# Patient Record
Sex: FEMALE | Race: WHITE | NOT HISPANIC OR LATINO | Employment: OTHER | URBAN - METROPOLITAN AREA
[De-identification: names, ages, dates, MRNs, and addresses within clinical notes are randomized per-mention and may not be internally consistent; named-entity substitution may affect disease eponyms.]

---

## 2017-01-25 ENCOUNTER — ALLSCRIPTS OFFICE VISIT (OUTPATIENT)
Dept: OTHER | Facility: OTHER | Age: 67
End: 2017-01-25

## 2018-01-13 VITALS
RESPIRATION RATE: 14 BRPM | DIASTOLIC BLOOD PRESSURE: 54 MMHG | HEIGHT: 63 IN | BODY MASS INDEX: 20.91 KG/M2 | WEIGHT: 118 LBS | SYSTOLIC BLOOD PRESSURE: 90 MMHG | HEART RATE: 84 BPM

## 2018-01-24 ENCOUNTER — OFFICE VISIT (OUTPATIENT)
Dept: NEUROLOGY | Facility: CLINIC | Age: 68
End: 2018-01-24
Payer: MEDICARE

## 2018-01-24 VITALS
SYSTOLIC BLOOD PRESSURE: 92 MMHG | HEIGHT: 63 IN | RESPIRATION RATE: 16 BRPM | BODY MASS INDEX: 20.55 KG/M2 | HEART RATE: 78 BPM | WEIGHT: 116 LBS | DIASTOLIC BLOOD PRESSURE: 50 MMHG

## 2018-01-24 DIAGNOSIS — G30.0 EARLY ONSET ALZHEIMER'S DEMENTIA WITHOUT BEHAVIORAL DISTURBANCE (HCC): Primary | ICD-10-CM

## 2018-01-24 DIAGNOSIS — F02.80 EARLY ONSET ALZHEIMER'S DEMENTIA WITHOUT BEHAVIORAL DISTURBANCE (HCC): Primary | ICD-10-CM

## 2018-01-24 PROCEDURE — 99214 OFFICE O/P EST MOD 30 MIN: CPT | Performed by: PSYCHIATRY & NEUROLOGY

## 2018-01-24 RX ORDER — ACETAMINOPHEN 325 MG/1
650 TABLET ORAL EVERY 12 HOURS PRN
COMMUNITY

## 2018-01-24 RX ORDER — CRANBERRY FRUIT EXTRACT 200 MG
200 CAPSULE ORAL DAILY
COMMUNITY

## 2018-01-24 RX ORDER — FLUDROCORTISONE ACETATE 0.1 MG/1
0.1 TABLET ORAL DAILY
COMMUNITY

## 2018-01-24 RX ORDER — DIVALPROEX SODIUM 125 MG/1
250 TABLET, DELAYED RELEASE ORAL EVERY 12 HOURS SCHEDULED
COMMUNITY

## 2018-01-24 RX ORDER — SERTRALINE HYDROCHLORIDE 25 MG/1
25 TABLET, FILM COATED ORAL DAILY
COMMUNITY

## 2018-01-24 RX ORDER — DONEPEZIL HYDROCHLORIDE 5 MG/1
5 TABLET, FILM COATED ORAL
COMMUNITY

## 2018-01-24 RX ORDER — LANOLIN ALCOHOL/MO/W.PET/CERES
3 CREAM (GRAM) TOPICAL
COMMUNITY

## 2018-01-24 RX ORDER — MEMANTINE HYDROCHLORIDE 10 MG/1
10 TABLET ORAL DAILY
COMMUNITY

## 2018-01-24 NOTE — PATIENT INSTRUCTIONS
Advanced Alzheimer dementia- progression since last seen  Time spent discussing prognosis and medications  At this point, medication are not likely beneficial therefore will discontinue Namenda and donepezil  She will remain on Depakote and sertraline for mood  In 6 months, would suggest primary physician at facility assess whether she is if stable and having no bouts of aggression or mood lability causing difficulty managing care  If so would recommend tapering off Depakote

## 2018-01-24 NOTE — PROGRESS NOTES
Patient ID: Eva Rasmussen is a 78 y o  female  Assessment/Plan:    Early onset Alzheimer's dementia without behavioral disturbance  Progression since last seen  Physically doing well but no longer communicating effectively  Time spent discussing prognosis and role of medication in Ad  At this point I do not believe Namenda and donepezil are providing much benefit and therefore will discontinue  She will remain on Depakote and Sertraline  Primary physician at the facility should reassess in 6 months and if mood stable with no acts of aggression or trouble with care, would recommend tapering off Depakote  Problem List Items Addressed This Visit     Early onset Alzheimer's dementia without behavioral disturbance - Primary     Progression since last seen  Physically doing well but no longer communicating effectively  Time spent discussing prognosis and role of medication in Ad  At this point I do not believe Namenda and donepezil are providing much benefit and therefore will discontinue  She will remain on Depakote and Sertraline  Primary physician at the facility should reassess in 6 months and if mood stable with no acts of aggression or trouble with care, would recommend tapering off Depakote  Relevant Medications    donepezil (ARICEPT) 5 mg tablet    memantine (NAMENDA) 10 mg tablet    sertraline (ZOLOFT) 25 mg tablet             Subjective:    Patient is a [facility] with Alzheimer's disease who presents for follow up ; First seen in 2017 wit advanced AD,  requiring assistance with all activities and unable to hold a conversation, on Namenda 10mg daily and donepezil 5mg daily (previously on higher doses)  She was diagnosed with Alzheimer disease in 2010 by Dr Maria Luisa Moncada in [facility]  Physically functioning well   When first seen after a discussion regarding prognosis with daughter, and answering questions regarding the role of medications and cost  It was suggested se stop Namenda given response is variable at this point  She was also on a study drug when first seen  Patient remains on donepezil 5mg daily, Namenda 10mg, Depakote bid, sertraline 25mg daily for mood  Also on fludricortisone at tis point  She needs assistance with all ADL's  She no longer can communicate effectively  Appetite has decreased but weight is stable  Daughter has noted she tends to flex forward at time sbut is easily redirected  She also has bouts where she makes a noise lasting a minute or so  She does not appear in pain  Her mother and sister visit often and express concern  She is sleeping well for the most part  The family has questions regarding need for medications given cost          The following portions of the patient's history were reviewed and updated as appropriate: allergies, current medications, past medical history, past surgical history and problem list          Objective:    Blood pressure 92/50, pulse 78, resp  rate 16, height 5' 3" (1 6 m), weight 52 6 kg (116 lb)  Physical Exam   Eyes: EOM are normal  Pupils are equal, round, and reactive to light  Neurological: She has normal strength  Gait normal    Psychiatric: Her speech is normal        Neurological Exam    Mental Status  The patient is alert and disoriented to place, time and situation and oriented to person  Her speech is normal  She is able to name object, repeat and write  She has poor concentration  She is unable to follow commands  She has poor comprehension  Cranial Nerves    CN II: The patient's visual acuity and visual fields are normal   CN III, IV, VI: The patient's pupils are equally round and reactive to light and ocular movements are normal   CN V: The patient has normal facial sensation  CN VII:  The patient has symmetric facial movement  CN VIII:  The patient's hearing is normal   CN IX, X: The patient has symmetric palate movement and normal gag reflex    CN XI: The patient's shoulder shrug strength is normal   CN XII: The patient's tongue is midline without atrophy or fasciculations  Motor   Her overall muscle tone is normal throughout  Her strength is 5/5 throughout all four extremities  Sensory  The patient's sensation is to light touch  Reflexes  She has glabellar tap release signs present  Gait and Coordination  The patient has normal gait and station  Needs to be guided, postural stooping         ROS:    Review of Systems   Constitutional: Positive for appetite change  Negative for fever  HENT: Negative  Negative for hearing loss, tinnitus, trouble swallowing and voice change  Eyes: Negative  Negative for photophobia and pain  Respiratory: Negative  Negative for shortness of breath  Cardiovascular: Negative  Negative for palpitations  Gastrointestinal: Negative  Negative for nausea and vomiting  Endocrine: Negative  Negative for cold intolerance and heat intolerance  Genitourinary: Negative  Negative for dysuria, frequency and urgency  Musculoskeletal: Negative for myalgias and neck pain  Skin: Negative  Negative for rash  Neurological: Positive for speech difficulty  Negative for dizziness, tremors, seizures, syncope, facial asymmetry, weakness, light-headedness, numbness and headaches  Hematological: Negative  Does not bruise/bleed easily  Psychiatric/Behavioral: Positive for confusion and sleep disturbance  Negative for hallucinations

## 2018-01-24 NOTE — ASSESSMENT & PLAN NOTE
Progression since last seen  Physically doing well but no longer communicating effectively  Time spent discussing prognosis and role of medication in Ad  At this point I do not believe Namenda and donepezil are providing much benefit and therefore will discontinue  She will remain on Depakote and Sertraline  Primary physician at the facility should reassess in 6 months and if mood stable with no acts of aggression or trouble with care, would recommend tapering off Depakote

## 2020-11-06 LAB — EXT SARS-COV-2: NOT DETECTED

## 2021-01-13 LAB — EXT SARS-COV-2: NOT DETECTED

## 2021-01-18 LAB — EXT SARS-COV-2: NOT DETECTED

## 2021-01-27 LAB — EXT SARS-COV-2: NOT DETECTED

## 2021-07-05 ENCOUNTER — TELEPHONE (OUTPATIENT)
Dept: OTHER | Facility: OTHER | Age: 71
End: 2021-07-05

## 2021-07-05 NOTE — TELEPHONE ENCOUNTER
Kalie Monahan from [facility] called regarding Patient Seems Weaker, Tired, Elevated Heart Rate of 100 Pulse 0x 87, but went up to 91  Having a hard time walking      Paged the on call Provider

## 2021-08-02 ENCOUNTER — TELEPHONE (OUTPATIENT)
Dept: OTHER | Facility: OTHER | Age: 71
End: 2021-08-02

## 2021-08-03 NOTE — TELEPHONE ENCOUNTER
The patient fell and has pain  They are going to start the process of getting her to the ER  The on-call provider was paged

## 2023-02-13 RX ORDER — ACETAMINOPHEN 325 MG/1
650 TABLET ORAL DAILY
COMMUNITY

## 2023-02-13 RX ORDER — MORPHINE SULFATE 10 MG/5ML
5 SOLUTION ORAL
COMMUNITY
End: 2023-02-14 | Stop reason: ALTCHOICE

## 2023-02-13 RX ORDER — POLYETHYLENE GLYCOL 3350 17 G/17G
17 POWDER, FOR SOLUTION ORAL DAILY
COMMUNITY

## 2023-02-13 RX ORDER — CHOLECALCIFEROL (VITAMIN D3) 125 MCG
5 CAPSULE ORAL
COMMUNITY

## 2023-02-13 RX ORDER — LORAZEPAM 2 MG/ML
0.25 CONCENTRATE ORAL EVERY 4 HOURS PRN
COMMUNITY
End: 2023-02-14 | Stop reason: ALTCHOICE

## 2023-02-13 RX ORDER — ACETAMINOPHEN 325 MG/1
650 TABLET ORAL 3 TIMES DAILY PRN
COMMUNITY

## 2023-02-14 ENCOUNTER — IN HOME VISIT (OUTPATIENT)
Dept: GERIATRICS | Facility: OTHER | Age: 73
End: 2023-02-14

## 2023-02-14 DIAGNOSIS — K59.01 SLOW TRANSIT CONSTIPATION: ICD-10-CM

## 2023-02-14 DIAGNOSIS — F02.80 EARLY ONSET ALZHEIMER'S DEMENTIA WITHOUT BEHAVIORAL DISTURBANCE (HCC): Primary | ICD-10-CM

## 2023-02-14 DIAGNOSIS — Z91.81 HISTORY OF FALLING: ICD-10-CM

## 2023-02-14 DIAGNOSIS — G30.0 EARLY ONSET ALZHEIMER'S DEMENTIA WITHOUT BEHAVIORAL DISTURBANCE (HCC): Primary | ICD-10-CM

## 2023-02-14 DIAGNOSIS — E03.9 HYPOTHYROIDISM, UNSPECIFIED TYPE: ICD-10-CM

## 2023-02-14 NOTE — PROGRESS NOTES
[address], [address], [facility], [address] [place] [address]  [phone]    NAME: Benny Arenas  AGE: 71 y o  SEX: female    Progress Note    Location: [facility] ([facility])  POS: 13    Assessment/Plan:    Early onset Alzheimer's dementia without behavioral disturbance ([address] )  Continue [facility] supportive care  Recently taken off hospice care: stable  Per nursing, patient with good to fair meal completion  Per nursing, poor sleep patterns at night time but no sundowning behaviors - makes up sleep at daytime  D/C PRN Lorazepam Q4 hours and Morphine Q1 hour  Continue Tylenol 650mg daily in AM  Continue PRN Tylenol 650mg TID  Continue Melatonin 5mg daily at HS  Has pending labs on March 2, 2023: CBC with diff/ CMP/ TSH    Hypothyroidism  Last TSH (3/8/2022): 3 12 (WNL)  Currently not on T4 replacement medication (prev hospice)  TSH with reflex Free T4 on 3/2/2023    Slow transit constipation  Continue Miralax 17G daily    History of falling  No recent fall reported  [facility]    Chief complaint / Reason for visit: Follow-up visit    History of Present Illness: This is a 18-year-old female patient admitted at [facility] community  Patient is seen and examined today to follow-up acute on chronic medical conditions: Early onset Alzheimer's Disease, Ambulatory dysfunction with history of falls, Insomnia and hypothyroidism  Patient is out of bed for this visit sitting in the common room -sleepy this morning but according to nursing this is baseline for patient as she slept very poorly at bedtime  Patient is also nonverbal nor did she engage in conversation or participated during physical assessment despite multiple attempts to engage in conversation or when given simple instructions  Per nursing, this is baseline for patient -typically only mumbles words that are unintelligible  Unable to assess for ROS  Update and information about patient provided by nursing staff on this visit  Per nursing, other than poor sleep at bedtime, patient is deemed at baseline and stable  Patient is also being followed by podiatry  Patient was previously under hospice management but had since discontinued - patient stable  Nursing and prior provider notes reviewed on this visit  Discussed with PCP and nursing supervisor  Called and spoken to patient POA (son) today  Updated on visit today  Discussed about patient meds as well  Agreeable to D/C comfort meds: Lorazepam (sleepy at daytime) and Morphine  Review of Systems:  Per history of present illness, all other systems reviewed and negative  HISTORY:  Medical Hx: Reviewed, unchanged  Family Hx: Reviewed, unchanged  Soc Hx: Reviewed,  unchanged    ALLERGY: Reviewed, unchanged  Allergies   Allergen Reactions   • Sulfa Antibiotics Other (See Comments)     per t system        PHYSICAL EXAM:  Vital Signs: T98 3F -P92 -R16 BP: 175/60 (2/6/2023) SpO2: 100% RA  Weight: 104 3 lbs (2/1/2023)    General: NAD  Frail stature  Head: Atraumatic  Normocephalic  Eye Exam: anicteric sclera, no discharge, PERRLA, No injection  Oral Exam: unable to assess - did not participate  Neck Exam: no anterior cervical lymphadenopathy noted, neck supple  Cardiovascular: regular rate, regular rhythm, no murmurs, rubs, or gallops  Pulmonary: no wheeze, no rhonchi, no rales  No chest tenderness  Limited assessment due to non participation  Abdominal: soft, non-tender, nondistended, bowel sounds audible x 4 quadrants  : Non distended bladder  Incontinent  Extremities and skin: no edema noted, no rashes  Left great toe wound - stable  Neurological: sleepy but easily wakens  moving all 4 extremities symmetrically  Non verbal     Laboratory results / Imaging reviewed: Hard copy/ies in medical chart  Last labs done on March, 2022    Current Medications:   All medications reviewed and updated in Nursing Home Chart    CURRENT MEDICATION LIST IN [facility]:    Current Outpatient Medications:   •  acetaminophen (TYLENOL) 325 mg tablet, Take 650 mg by mouth in the morning, Disp: , Rfl:   •  acetaminophen (TYLENOL) 325 mg tablet, Take 650 mg by mouth 3 (three) times a day as needed, Disp: , Rfl:   •  Melatonin 5 MG TABS, Take 5 mg by mouth daily at bedtime, Disp: , Rfl:   •  polyethylene glycol (MIRALAX) 17 g packet, Take 17 g by mouth daily, Disp: , Rfl:       Please note: This note was completed in part utilizing a voice-recognition software may have been used in the preparation of this document  Grammatical errors, random word insertion, spelling mistakes, and incomplete sentences may be an occasional consequence of the system secondary to software limitations, ambient noise and hardware issues  Occasional wrong word or "sound-alike" substitutions may have occurred due to the inherent limitations of voice recognition software  At the time of dictation, efforts were made to edit, clarify and/or correct errors  Interpretation should be guided by context  Please read the chart carefully and recognize, using context, where substitutions have occurred  If you have any questions or concerns about the context, text or information contained within the body of this dictation, please contact myself, the provider, for further clarification        HARI Camp  2/14/2023

## 2023-02-14 NOTE — ASSESSMENT & PLAN NOTE
Continue [facility] supportive care  Recently taken off hospice care: stable  Per nursing, patient with good to fair meal completion  Per nursing, poor sleep patterns at night time but no sundowning behaviors - makes up sleep at daytime    D/C PRN Lorazepam Q4 hours and Morphine Q1 hour  Continue Tylenol 650mg daily in AM  Continue PRN Tylenol 650mg TID  Continue Melatonin 5mg daily at HS  Has pending labs on March 2, 2023: CBC with diff/ CMP/ TSH

## 2023-02-14 NOTE — ASSESSMENT & PLAN NOTE
Last TSH (3/8/2022): 3 12 (WNL)  Currently not on T4 replacement medication (prev hospice)  TSH with reflex Free T4 on 3/2/2023

## 2023-03-28 ENCOUNTER — IN HOME VISIT (OUTPATIENT)
Dept: GERIATRICS | Facility: OTHER | Age: 73
End: 2023-03-28

## 2023-03-28 DIAGNOSIS — R53.83 LETHARGY: Primary | ICD-10-CM

## 2023-03-28 NOTE — ASSESSMENT & PLAN NOTE
Deemed resolved on this visit  Per nursing, patient back to baseline mentation  VSS  Continue to monitor and report any recurrence or new symptom/s

## 2023-03-28 NOTE — PROGRESS NOTES
[address], [address], [facility], [address] [place] [address]  [phone]    NAME: Merlyn Comment  AGE: 71 y o  SEX: female    Progress Note    Location: [facility] ([facility])  POS: 13    Assessment/Plan:    Lethargy  Deemed resolved on this visit  Per nursing, patient back to baseline mentation  VSS  Continue to monitor and report any recurrence or new symptom/s  Chief complaint / Reason for visit:  Acute Visit    History of Present Illness: This is a 76-year-old female patient residing [facility]  Patient is seen and examined today per nursing request for observation of lethargy in the last 2 to 3 days  Per nursing, patient has been presenting with fatigue presentation, poor appetite and over all change on mentation  Patient is out of bed for this visit -sitting up at the common area -alert, cooperative, calm, pleasant and not in distress  Patient at baseline is nonverbal   Per nursing, patient seems to be better today: ate 100% of breakfast, ambulation and getting out of bed and alertness is back to baseline  Nursing to continue to monitor for now and report any recurrence or new symptoms  Nursing and prior provider notes reviewed on this visit  Discussed visit with PCP and nursing staff/ supervisor  Review of Systems:  Per history of present illness, all other systems reviewed and negative  HISTORY:  Medical Hx: Reviewed, unchanged  Family Hx: Reviewed, unchanged  Soc Hx: Reviewed,  unchanged    ALLERGY: Reviewed, unchanged  Allergies   Allergen Reactions   • Sulfa Antibiotics Other (See Comments)     per t system        PHYSICAL EXAM:  Vital Signs: T98 0F -P89 -R16 BP: 160/60 (3/3/2023) Spo2: % RA  Weight: 101 7 lbs (3/1/2023    General: NAD  Frail stature  Well appearing  Not in distress  Head: Atraumatic  Normocephalic  Eye Exam: anicteric sclera, no discharge, PERRLA, No injection  Oral Exam: unable to assess - did not participate    Neck Exam: no "anterior cervical lymphadenopathy noted, neck supple  Cardiovascular: regular rate, regular rhythm, no murmurs, rubs, or gallops  Pulmonary: no wheeze, no rhonchi, no rales  No chest tenderness  Limited assessment due to non participation  Abdominal: soft, non-tender, nondistended, bowel sounds audible x 4 quadrants  : Non distended bladder  Incontinent  Extremities and skin: no edema noted, no rashes  Left great toe wound - stable  Neurological: sleepy but easily wakens  moving all 4 extremities symmetrically  Non verbal     Laboratory results / Imaging reviewed: Hard copy/ies in medical chart  No recent labs on file after Mar, 2022  Current Medications: All medications reviewed and updated in Nursing Home Chart    Please note: This note was completed in part utilizing a voice-recognition software may have been used in the preparation of this document  Grammatical errors, random word insertion, spelling mistakes, and incomplete sentences may be an occasional consequence of the system secondary to software limitations, ambient noise and hardware issues  Occasional wrong word or \"sound-alike\" substitutions may have occurred due to the inherent limitations of voice recognition software  At the time of dictation, efforts were made to edit, clarify and/or correct errors  Interpretation should be guided by context  Please read the chart carefully and recognize, using context, where substitutions have occurred  If you have any questions or concerns about the context, text or information contained within the body of this dictation, please contact myself, the provider, for further clarification        HARI Huynh  3/28/2023  "

## 2023-06-14 ENCOUNTER — IN HOME VISIT (OUTPATIENT)
Dept: GERIATRICS | Facility: OTHER | Age: 73
End: 2023-06-14
Payer: MEDICARE

## 2023-06-14 DIAGNOSIS — G30.0 EARLY ONSET ALZHEIMER'S DEMENTIA WITHOUT BEHAVIORAL DISTURBANCE (HCC): Primary | ICD-10-CM

## 2023-06-14 DIAGNOSIS — I10 HYPERTENSION, UNSPECIFIED TYPE: ICD-10-CM

## 2023-06-14 DIAGNOSIS — K59.01 SLOW TRANSIT CONSTIPATION: ICD-10-CM

## 2023-06-14 DIAGNOSIS — E03.9 HYPOTHYROIDISM, UNSPECIFIED TYPE: ICD-10-CM

## 2023-06-14 DIAGNOSIS — F02.80 EARLY ONSET ALZHEIMER'S DEMENTIA WITHOUT BEHAVIORAL DISTURBANCE (HCC): Primary | ICD-10-CM

## 2023-06-14 PROCEDURE — 99349 HOME/RES VST EST MOD MDM 40: CPT | Performed by: NURSE PRACTITIONER

## 2023-06-15 PROBLEM — I10 HTN (HYPERTENSION): Status: ACTIVE | Noted: 2018-09-26

## 2023-06-15 PROBLEM — E55.9 VITAMIN D DEFICIENCY: Status: ACTIVE | Noted: 2018-09-26

## 2023-06-15 PROBLEM — E78.5 HLD (HYPERLIPIDEMIA): Status: ACTIVE | Noted: 2018-09-26

## 2023-06-16 NOTE — PROGRESS NOTES
[address], [address], [facility], [address] [place] [address]  [phone]    NAME: Sharmin Salcido  AGE: 68 y o  SEX: female    Progress Note    Location: [facility] ([facility])  POS: 13    Assessment/Plan:    Early onset Alzheimer's dementia without behavioral disturbance ([facility] [address] )  Per nursing, patient with good to fair meal completion  Weight gain  Per nursing, poor sleep patterns at night time but no sundowning behaviors - makes up sleep at daytime  Continue Tylenol 650mg daily in AM plus PRN Tylenol 650mg TID  Continue Melatonin 5mg daily at HS  TSH and Free T4 WNL (6/2/2023)  No anemia/ Renal and hepatic functions WNL (6/2/2023)  Continue [facility] supportive care    Hypothyroidism  TSH (6/2/2023):4 51 (WNL) <= 3 12 (WNL: 3/8/2022)  Free T4 WNL: 0 88 (6/2/2023)  Not on T4 replacement medication (prev hospice)  Will continue to monitor for now  Slow transit constipation  Continue Miralax 17G daily    HTN (hypertension)  BP Goal: < 140-150/90  BP range (Apr-June, 2023) = 92/52 to 125/59  HR range (Apr-June, 2023) = 72 to 100/min  Not on BB or any anti-HTN medication  Continue monthly BP/HR checks    Chief complaint / Reason for visit: Follow- visit    History of Present Illness: This is a 71-year-old female patient admitted at [facility]  Patient is seen and examined today to follow-up acute on chronic medical conditions: Early onset Alzheimer's Disease, Ambulatory dysfunction with history of falls, Insomnia and hypothyroidism      Patient is still in bed for this visit - sleepy still able to be awoken without difficulty  Patient is nonverbal nor engage or participated during physical assessment - similar with prior visit  Per nursing, this is baseline for patient -typically only mumbles words that are unintelligible  Unable to assess for ROS  Update and information about patient provided by nursing staff on this visit    Per nursing, patient is deemed at baseline and stable - no acute medical concerns for this visit  Nursing and prior provider notes reviewed on this visit  Discussed visit with PCP and nursing staff/ supervisor  Review of Systems:  Per history of present illness, all other systems reviewed and negative  HISTORY:  Medical Hx: Reviewed, unchanged  Family Hx: Reviewed, unchanged  Soc Hx: Reviewed,  unchanged    ALLERGY: Reviewed, unchanged  Allergies   Allergen Reactions   • Sulfa Antibiotics Other (See Comments)     per t system        PHYSICAL EXAM:  Vital Signs: T97 1F -P82 -R16 BP: 118/53 (6/10/2023) SpO2: 96% RA  Weight: 104 0 lbs (6/1/2023) <= 102 3 lbs (5/1/2023) <= 102 3 lbs (4/1/2023)    General: NAD  Well appearing  No acute distress, Frail stature  Head: Atraumatic  Normocephalic  Eye Exam: anicteric sclera, no discharge, PERRLA, No injection  Oral Exam: unable to assess - patient did not participate  Neck Exam: no anterior cervical lymphadenopathy noted, neck supple  Trachea midline, no carotid bruit, no masses  Cardiovascular: regular rate, irregular rhythm, no murmurs, rubs, or gallops  S1 and S2  Pulmonary: no wheeze, no rhonchi, no rales  No chest tenderness  Normal chest wall expansion  Abdominal: soft, non-tender, nondistended, bowel sounds audible x 4 quadrants  No palpable hepatosplenomegaly, no tympany  : Non distended bladder  Incontinent  Extremities and skin: no edema noted, no rashes  Intact skin  Neurological: sleepy, No tics, normal sensation to pressure and light touch  moving all 4 extremities symmetrically  Non verbal     Laboratory results / Imaging reviewed: Hard copy/ies in medical chart:    * CBC with diff (6/2/2023) = WNL    * CMP (6/2/2023) = WNL except:  Co: 19 (L)     * Free T4 (6/2/2023) = 0 88 (WNL)    * TSH (6/2/2023) = 4 51 (WNL)     Current Medications: All medications reviewed and updated in Nursing Home Chart    Please note:  This note was completed in part utilizing a voice-recognition software may have been used in the "preparation of this document  Grammatical errors, random word insertion, spelling mistakes, and incomplete sentences may be an occasional consequence of the system secondary to software limitations, ambient noise and hardware issues  Occasional wrong word or \"sound-alike\" substitutions may have occurred due to the inherent limitations of voice recognition software  At the time of dictation, efforts were made to edit, clarify and/or correct errors  Interpretation should be guided by context  Please read the chart carefully and recognize, using context, where substitutions have occurred  If you have any questions or concerns about the context, text or information contained within the body of this dictation, please contact myself, the provider, for further clarification        HARI Gallegos  6/15/2023  "

## 2023-06-16 NOTE — ASSESSMENT & PLAN NOTE
BP Goal: < 140-150/90  BP range (Apr-June, 2023) = 92/52 to 125/59  HR range (Apr-June, 2023) = 72 to 100/min  Not on BB or any anti-HTN medication  Continue monthly BP/HR checks

## 2023-06-16 NOTE — ASSESSMENT & PLAN NOTE
Per nursing, patient with good to fair meal completion  Weight gain  Per nursing, poor sleep patterns at night time but no sundowning behaviors - makes up sleep at daytime    Continue Tylenol 650mg daily in AM plus PRN Tylenol 650mg TID  Continue Melatonin 5mg daily at HS  TSH and Free T4 WNL (6/2/2023)  No anemia/ Renal and hepatic functions WNL (6/2/2023)  Continue [facility] supportive care

## 2023-06-16 NOTE — ASSESSMENT & PLAN NOTE
TSH (6/2/2023):4 51 (WNL) <= 3 12 (WNL: 3/8/2022)  Free T4 WNL: 0 88 (6/2/2023)  Not on T4 replacement medication (prev hospice)  Will continue to monitor for now

## 2023-08-11 DIAGNOSIS — Z51.5 HOSPICE CARE PATIENT: Primary | ICD-10-CM

## 2023-08-11 RX ORDER — LORAZEPAM 2 MG/ML
0.5 CONCENTRATE ORAL EVERY 6 HOURS PRN
Qty: 30 ML | Refills: 0 | Status: SHIPPED | OUTPATIENT
Start: 2023-08-11 | End: 2023-09-10

## 2023-08-11 RX ORDER — BISACODYL 10 MG
10 SUPPOSITORY, RECTAL RECTAL DAILY PRN
Qty: 14 SUPPOSITORY | Refills: 0 | Status: SHIPPED | OUTPATIENT
Start: 2023-08-11 | End: 2023-08-25

## 2023-08-11 RX ORDER — MORPHINE SULFATE 100 MG/5ML
5 SOLUTION, CONCENTRATE ORAL
Qty: 30 ML | Refills: 0 | Status: SHIPPED | OUTPATIENT
Start: 2023-08-11 | End: 2023-09-10

## 2023-12-05 ENCOUNTER — IN HOME VISIT (OUTPATIENT)
Dept: GERIATRICS | Facility: OTHER | Age: 73
End: 2023-12-05
Payer: MEDICARE

## 2023-12-05 DIAGNOSIS — K59.01 SLOW TRANSIT CONSTIPATION: ICD-10-CM

## 2023-12-05 DIAGNOSIS — F02.80 EARLY ONSET ALZHEIMER'S DEMENTIA WITHOUT BEHAVIORAL DISTURBANCE (HCC): Primary | ICD-10-CM

## 2023-12-05 DIAGNOSIS — G30.0 EARLY ONSET ALZHEIMER'S DEMENTIA WITHOUT BEHAVIORAL DISTURBANCE (HCC): Primary | ICD-10-CM

## 2023-12-05 DIAGNOSIS — Z51.5 HOSPICE CARE PATIENT: ICD-10-CM

## 2023-12-05 PROCEDURE — 99348 HOME/RES VST EST LOW MDM 30: CPT | Performed by: NURSE PRACTITIONER

## 2023-12-05 RX ORDER — MORPHINE SULFATE 100 MG/5ML
5 SOLUTION, CONCENTRATE ORAL
COMMUNITY

## 2023-12-05 RX ORDER — ACETAMINOPHEN 325 MG/1
650 TABLET ORAL DAILY
COMMUNITY

## 2023-12-05 NOTE — ASSESSMENT & PLAN NOTE
Continue comfort medications as ordered. Patient appears to be comfortable and calm. Not in distress.

## 2023-12-05 NOTE — ASSESSMENT & PLAN NOTE
Progressive weight loss  Per nursing, patient eating better now, more awake  No anemia.  Renal and Hepatic function WNL (12/4/2023)  Low Albumin/ TProtein (12/4/2023)  Patient under [address] care  Continue [facility] supportive care

## 2023-12-05 NOTE — PROGRESS NOTES
[facility]  [address] [facility], [address], [place] [address] [address]  [phone]    NAME: Luci Rodriguez  AGE: 68 y.o. SEX: female    Progress Note    Location: [facility] ([facility])  POS: 13    Assessment/Plan:    Early onset Alzheimer's dementia without behavioral disturbance ([address] )  Progressive weight loss  Per nursing, patient eating better now, more awake  No anemia. Renal and Hepatic function WNL (12/4/2023)  Low Albumin/ TProtein (12/4/2023)  Patient under [address] care  Continue [facility] supportive care    Slow transit constipation  Per nursing, regular BM  Continue Miralax daily    Hospice care patient  Continue comfort medications as ordered. Patient appears to be comfortable and calm. Not in distress. HTN (hypertension)  Ave SBP Leroy [place]ster, 2023): 100-105  Not on anti-HTN medication  Under hospice care  Continue monthly BP/HR in [facility]      Chief complaint / Reason for visit:  Follow- visit    History of Present Illness: This is a 71-year-old female patient residing at [facility]. Patient is seen and examined today to follow-up acute on chronic medical conditions. Patient is OOB sitting in pramod chair attending a unit activity - alert, cooperative, calm and not in distress. Per nursing, patient is more alert, eating better than before. DTI to Left heel resolved. No skin breakdown. Unable to assess for ROS due to dementia. Update and information about patient provided by nursing staff on this visit. Per nursing, no acute medical concerns for this visit. Nursing and prior provider notes reviewed on this visit. Discussed visit with PCP and nursing staff/ supervisor. Review of Systems:  Per history of present illness, all other systems reviewed and negative. HISTORY:  Medical Hx: Reviewed, unchanged  Family Hx: Reviewed, unchanged  Soc Hx: Reviewed,  unchanged    ALLERGY: Reviewed, unchanged.    Allergies   Allergen Reactions    Sulfa Antibiotics Other (See Comments) per t system        PHYSICAL EXAM:  Vital Signs: T96.1F -P87 -R16 BP: 114/53 (10/6/2023) SpO2: 93% RA  Weight: 78.4 lbs (12/4/2023) <= 88.9 lbs (11/6/2023) <= 88.9 lbs (10/9/2023)    General: NAD. Well appearing. No acute distress, Frail stature versus cachexia  Head: Atraumatic. Normocephalic. Eye Exam: anicteric sclera, no discharge, PERRLA, No injection  Oral Exam: unable to assess - patient did not participate  Neck Exam: no anterior cervical lymphadenopathy noted, neck supple. Trachea midline, no carotid bruit, no masses  Cardiovascular: regular rate, irregular rhythm, no murmurs, rubs, or gallops. S1 and S2  Pulmonary: no wheeze, no rhonchi, no rales. No chest tenderness. Normal chest wall expansion. Abdominal: soft, non-tender, nondistended, bowel sounds audible x 4 quadrants. No palpable hepatosplenomegaly, no tympany  : Non distended bladder. Incontinent. Extremities and skin: no edema noted, no rashes. Intact skin  Neurological: sleepy, No tics, normal sensation to pressure and light touch. moving all 4 extremities symmetrically. Non verbal.    Laboratory / Imaging results reviewed. Last labs done on 12/4/2023    Current Medications: All medications reviewed and updated in [address] [place] [address]. Please note: This note was completed in part utilizing a voice-recognition software may have been used in the preparation of this document. Grammatical errors, random word insertion, spelling mistakes, and incomplete sentences may be an occasional consequence of the system secondary to software limitations, ambient noise and hardware issues. Occasional wrong word or "sound-alike" substitutions may have occurred due to the inherent limitations of voice recognition software. At the time of dictation, efforts were made to edit, clarify and/or correct errors. Interpretation should be guided by context. Please read the chart carefully and recognize, using context, where substitutions have occurred.  If you have any questions or concerns about the context, text or information contained within the body of this dictation, please contact myself, the provider, for further clarification.       HARI Echols  12/5/2023

## 2023-12-05 NOTE — ASSESSMENT & PLAN NOTE
Ave SBP Priscila Coil, 2023): 100-105  Not on anti-HTN medication  Under hospice care  Continue monthly BP/HR in [facility]

## 2024-06-06 ENCOUNTER — IN HOME VISIT (OUTPATIENT)
Dept: GERIATRICS | Facility: OTHER | Age: 74
End: 2024-06-06
Payer: MEDICARE

## 2024-06-06 DIAGNOSIS — I10 PRIMARY HYPERTENSION: ICD-10-CM

## 2024-06-06 DIAGNOSIS — F02.80 EARLY ONSET ALZHEIMER'S DEMENTIA WITHOUT BEHAVIORAL DISTURBANCE (HCC): Primary | ICD-10-CM

## 2024-06-06 DIAGNOSIS — Z51.5 HOSPICE CARE PATIENT: ICD-10-CM

## 2024-06-06 DIAGNOSIS — K59.01 SLOW TRANSIT CONSTIPATION: ICD-10-CM

## 2024-06-06 DIAGNOSIS — G30.0 EARLY ONSET ALZHEIMER'S DEMENTIA WITHOUT BEHAVIORAL DISTURBANCE (HCC): Primary | ICD-10-CM

## 2024-06-06 PROCEDURE — 99348 HOME/RES VST EST LOW MDM 30: CPT | Performed by: NURSE PRACTITIONER

## 2024-07-02 NOTE — PROGRESS NOTES
[facility]  [address] [place] [address], [address], [place] [address]  [phone]    NAME: Ryan Saleem  AGE: 74 y.o. SEX: female    Progress Note    Location: [facility]  POS: 13    Assessment/Plan:    Early onset Alzheimer's dementia without behavioral disturbance (HCC)  Progressive overall decline  Weight Trends up/down  Very Frail stature versus cachexia  Patient under [facility] care  Continue [facility] supportive care    HTN (hypertension)  Continues on monthly BP/HR checks in [facility]  BP range (Apr-June/ 2024) = 94/57 to 119/64  HR range (Apr-June/ 2024) = 62 to 131/min  Not on anti-HTN medication  Under hospice care    Slow transit constipation  Continue Bisacodul Supp daily PRN / Continue Miralax daily    Hospice care patient  Continue comfort medications as ordered:  Tylenol 975mg daily in AM  Levsin sulingual Q4 hours PRN  Lorazepam 0.25mL (= 0.5mg) Q6 hours PRN  Morphine 5mg every 1 hour PRN  Valium 10mg MO as needed - seizure like movement      Chief complaint / Reason for visit:  Follow- visit    History of Present Illness:  This is a 73-year-old female patient residing at [facility].  Patient is seen and examined today to follow-up any acute and chronic medical conditions.  Patient is out of bed for this visit sitting in Jennifer chair attending unit activity.  Patient is alert, cooperative, calm and not in distress. One CNA present on this visit.  Patient is non verbal -appears to be comfortable.  Unable to assess for ROS.  Update information provided by nursing: Patient reported as stable; appetite improving after several days poor appetite.  No acute medical concerns for this visit.    Nursing and prior provider notes reviewed on this visit. Discussed visit with PCP and nursing staff/ supervisor.    Review of Systems:  Per history of present illness, all other systems reviewed and negative.    HISTORY:  Medical Hx: Reviewed, unchanged  Family Hx: Reviewed, unchanged  Soc  "Hx: Reviewed,  unchanged    ALLERGY: Reviewed, unchanged.   Allergies   Allergen Reactions    Sulfa Antibiotics Other (See Comments)     per t system        PHYSICAL EXAM:  Vital Signs: T 98.3F -HR 87 -R 16 -BP: 99/71 -SpO2 96% RA  Weight: 82.6 lbs (6/3/2024) <= 91.1 lbs (5/3/2024) <= 81.5 lbs (4/3/2024) <= 79.6 lbs (3/3/2024)    General: NAD. Well appearing. No acute distress. Frail stature versus cachexia  Head: Atraumatic. Normocephalic.  Eye Exam: anicteric sclera, no discharge, PERRLA, No injection  Oral Exam: moist mucous membranes, no buccaloropharyngeal erythema, palatine tonsils WNL.  Neck Exam: no anterior cervical lymphadenopathy noted, neck supple. Trachea midline, no carotid bruit, no masses  Cardiovascular: regular rate, irregular rhythm, no: murmurs/ rubs/ gallops. S1 and S2 appreciated.  Pulmonary: no wheeze, no rhonchi, no rales. No chest tenderness. Normal chest wall expansion  Abdominal: soft, non-tender, nondistended, bowel sounds audible x 4 quadrants. No palpable hepatosplenomegaly, no tympany  : Non distended bladder. Incontinent.  Extremities and skin: no edema noted, no rashes. Intact skin  Neurological: alert. Non verbal. No tics, normal sensation to pressure and light touch.  moving all 4 extremities symmetrically. Non ambulatory.    Laboratory / Imaging results reviewed. Last labs done on Dec/2023.    Current Medications: All medications reviewed and updated in Nursing [facility].    Please note: This note was completed in part utilizing a voice-recognition software may have been used in the preparation of this document. Grammatical errors, random word insertion, spelling mistakes, and incomplete sentences may be an occasional consequence of the system secondary to software limitations, ambient noise and hardware issues. Occasional wrong word or \"sound-alike\" substitutions may have occurred due to the inherent limitations of voice recognition software. At the time of dictation, efforts " were made to edit, clarify and/or correct errors. Interpretation should be guided by context. Please read the chart carefully and recognize, using context, where substitutions have occurred. If you have any questions or concerns about the context, text or information contained within the body of this dictation, please contact myself, the provider, for further clarification.      HARI Heredia  7/1/2024

## 2024-07-02 NOTE — ASSESSMENT & PLAN NOTE
Continue comfort medications as ordered:  Tylenol 975mg daily in AM  Levsin sulingual Q4 hours PRN  Lorazepam 0.25mL (= 0.5mg) Q6 hours PRN  Morphine 5mg every 1 hour PRN  Valium 10mg DE as needed - seizure like movement

## 2024-07-02 NOTE — ASSESSMENT & PLAN NOTE
Progressive overall decline  Weight Trends up/down  Very Frail stature versus cachexia  Patient under [facility] care  Continue long term supportive care

## 2024-12-10 ENCOUNTER — IN HOME VISIT (OUTPATIENT)
Dept: GERIATRICS | Facility: CLINIC | Age: 74
End: 2024-12-10
Payer: MEDICARE

## 2024-12-10 DIAGNOSIS — G30.0 EARLY ONSET ALZHEIMER'S DEMENTIA WITHOUT BEHAVIORAL DISTURBANCE (HCC): Primary | ICD-10-CM

## 2024-12-10 DIAGNOSIS — F02.80 EARLY ONSET ALZHEIMER'S DEMENTIA WITHOUT BEHAVIORAL DISTURBANCE (HCC): Primary | ICD-10-CM

## 2024-12-10 PROCEDURE — 99212 OFFICE O/P EST SF 10 MIN: CPT | Performed by: INTERNAL MEDICINE

## 2024-12-10 NOTE — ASSESSMENT & PLAN NOTE
Actively dying   Will increase dose of prn morphine to 30minutes   Continue with safety measures  On hospice   Continue with prn ativan   Continue with prn levsin  Talked to family and answered questions   Continue to keep patient comfortable

## 2024-12-10 NOTE — PROGRESS NOTES
[facility] PERSONAL CARE [facility]     NAME: Ryan Saleem  AGE: 74 y.o. SEX: female    DATE OF ENCOUNTER: 12/10/2024    Assessment and Plan   Early onset Alzheimer's dementia without behavioral disturbance (HCC)  Actively dying   Will increase dose of prn morphine to 30minutes   Continue with safety measures  On hospice   Continue with prn ativan   Continue with prn levsin  Talked to family and answered questions   Continue to keep patient comfortable          Chief Complaint     Actively dying     History of Present Illness     75 yo female seen as per request by staff. Staff report patient actively dying. Patient was seen with family present. Family had a few questions answered there questions. Talked to nurses about patient and her current condition and adjustments of medications.     PMHx     Past Medical History:   Diagnosis Date    Anxiety     Depression     Folate deficiency     Hyperlipidemia     Hypotension     Psychosis (HCC)     Vitamin D deficiency      Past Surgical History:   Procedure Laterality Date     SECTION      SKIN GRAFT       Family History   Problem Relation Age of Onset    Alzheimer's disease Father      Social History     Socioeconomic History    Marital status:      Spouse name: Not on file    Number of children: Not on file    Years of education: Not on file    Highest education level: Not on file   Occupational History    Not on file   Tobacco Use    Smoking status: Never     Passive exposure: Past    Smokeless tobacco: Never   Substance and Sexual Activity    Alcohol use: Never    Drug use: Never    Sexual activity: Not Currently   Other Topics Concern    Not on file   Social History Narrative    Not on file     Social Drivers of Health     Financial Resource Strain: Not on file   Food Insecurity: Not on file   Transportation Needs: Not on file   Physical Activity: Not on file   Stress: Not on file   Social Connections: Not on file   Intimate Partner  Violence: Not on file   Housing Stability: Not on file     Allergies   Allergen Reactions    Sulfa Antibiotics Other (See Comments)     per t system       Review of Systems     Unable to get as patient is unresponsive     Objective       PHYSICAL EXAM:  GENERAL: no acute distress, actively dying   SKIN: cold and clammy, pale   NEUROLOGY: unresponsive   PSYCH: cooperative       Pertinent Laboratory/Diagnostic Studies:  Recent labs and diagnostic tests reviewed in EMR    Current Medications   Medications reviewed in EMR and facility

## 2024-12-11 ENCOUNTER — TELEPHONE (OUTPATIENT)
Age: 74
End: 2024-12-11

## 2024-12-11 NOTE — TELEPHONE ENCOUNTER
Called [facility] ( [phone]) have Death Certificate faxed to [phone].      Rcvd Death Certificate from  Home, reached out to providers.

## 2024-12-11 NOTE — TELEPHONE ENCOUNTER
Antionette from a  home called and stated pt passed away yesterday 12/10/24 while at [facility]. She is needing Dr Dowling to sign the death certificate. I gave her the fax number for [place].

## 2024-12-12 NOTE — TELEPHONE ENCOUNTER
Antionette called in to check on patients death certificate. She is still waiting for the signed form to be sent back to her. Please advise.

## 2024-12-13 NOTE — TELEPHONE ENCOUNTER
Antionette calling in again stating she still has not recent paperwork. Read message to Antionette. She is needing paperwork sent to her asap as patients services are on Monday . Please advise.